# Patient Record
Sex: MALE | URBAN - METROPOLITAN AREA
[De-identification: names, ages, dates, MRNs, and addresses within clinical notes are randomized per-mention and may not be internally consistent; named-entity substitution may affect disease eponyms.]

---

## 2020-05-15 ENCOUNTER — APPOINTMENT (RX ONLY)
Dept: URBAN - METROPOLITAN AREA CLINIC 12 | Facility: CLINIC | Age: 25
Setting detail: DERMATOLOGY
End: 2020-05-15

## 2020-05-15 VITALS — TEMPERATURE: 97.9 F

## 2020-05-15 DIAGNOSIS — L43.8 OTHER LICHEN PLANUS: ICD-10-CM

## 2020-05-15 PROBLEM — D23.5 OTHER BENIGN NEOPLASM OF SKIN OF TRUNK: Status: ACTIVE | Noted: 2020-05-15

## 2020-05-15 PROBLEM — L30.9 DERMATITIS, UNSPECIFIED: Status: ACTIVE | Noted: 2020-05-15

## 2020-05-15 PROCEDURE — ? COUNSELING

## 2020-05-15 PROCEDURE — ? TREATMENT REGIMEN

## 2020-05-15 PROCEDURE — 99202 OFFICE O/P NEW SF 15 MIN: CPT

## 2020-05-15 PROCEDURE — ? PRESCRIPTION

## 2020-05-15 RX ORDER — TRIAMCINOLONE ACETONIDE 1 MG/G
CREAM TOPICAL BID
Qty: 1 | Refills: 3 | Status: ERX | COMMUNITY
Start: 2020-05-15

## 2020-05-15 RX ADMIN — TRIAMCINOLONE ACETONIDE: 1 CREAM TOPICAL at 00:00

## 2020-05-15 ASSESSMENT — LOCATION ZONE DERM: LOCATION ZONE: PENIS

## 2020-05-15 ASSESSMENT — PAIN INTENSITY VAS: HOW INTENSE IS YOUR PAIN 0 BEING NO PAIN, 10 BEING THE MOST SEVERE PAIN POSSIBLE?: NO PAIN

## 2020-05-15 ASSESSMENT — LOCATION DETAILED DESCRIPTION DERM: LOCATION DETAILED: DORSAL PENILE SHAFT

## 2020-05-15 ASSESSMENT — LOCATION SIMPLE DESCRIPTION DERM: LOCATION SIMPLE: PENIS

## 2020-05-15 ASSESSMENT — SEVERITY ASSESSMENT: SEVERITY: MILD

## 2020-05-15 NOTE — PROCEDURE: TREATMENT REGIMEN
Initiate Treatment: Triamcinolone 0.1% ointment BID for two weeks to affected area until follow up
Detail Level: Simple
Plan: Declined biopsy at this time
Otc Regimen: Dove sensitive skin soap \\nAquaphor

## 2020-05-15 NOTE — HPI: SKIN LESION
Is This A New Presentation, Or A Follow-Up?: Skin Lesions
How Severe Is Your Skin Lesion?: mild
Has Your Skin Lesion Been Treated?: not been treated
Additional History: Pt has been experiencing a cycle of redness that drys out and flakes off every couple of weeks, got STD screened and everything came back negative, used an anti fungal cream BID for 4 days but it did not help so he stopped

## 2020-09-18 ENCOUNTER — APPOINTMENT (RX ONLY)
Dept: URBAN - METROPOLITAN AREA CLINIC 12 | Facility: CLINIC | Age: 25
Setting detail: DERMATOLOGY
End: 2020-09-18

## 2020-09-18 DIAGNOSIS — L28.0 LICHEN SIMPLEX CHRONICUS: ICD-10-CM

## 2020-09-18 PROCEDURE — 99212 OFFICE O/P EST SF 10 MIN: CPT

## 2020-09-18 PROCEDURE — ? COUNSELING

## 2020-09-18 PROCEDURE — ? TREATMENT REGIMEN

## 2020-09-18 ASSESSMENT — LOCATION DETAILED DESCRIPTION DERM: LOCATION DETAILED: BASE OF DORSAL PENILE SHAFT

## 2020-09-18 ASSESSMENT — LOCATION SIMPLE DESCRIPTION DERM: LOCATION SIMPLE: DORSAL PENIS

## 2020-09-18 ASSESSMENT — LOCATION ZONE DERM: LOCATION ZONE: PENIS

## 2020-09-18 NOTE — PROCEDURE: TREATMENT REGIMEN
Discontinue Regimen: Triamcinolone 0.1% ointment
Detail Level: Simple
Modify Regimen: Apply the steroid cream bid for 2 weeks maximum.  Patient understands that they should apply bid for maximum benefit.  Patient should apply a few days after rash is clear, as long as he is within the 3 week treatment period.  Patient should apply Cerave cream on top of steroid cream, immediately after bath or shower, and throughout the day as needed for overall moisture.
Samples Given: Ultravate lotion bid for up to 2 weeks then discontinue.  Cerave cream.\\nif needed take at least 1 week off and my use TAC .1% cream bid up to 2 weeks.  Avoid sexual activity until healed

## 2020-11-30 ENCOUNTER — APPOINTMENT (RX ONLY)
Dept: URBAN - METROPOLITAN AREA CLINIC 12 | Facility: CLINIC | Age: 25
Setting detail: DERMATOLOGY
End: 2020-11-30

## 2020-11-30 DIAGNOSIS — L28.0 LICHEN SIMPLEX CHRONICUS: ICD-10-CM

## 2020-11-30 PROCEDURE — ? PRESCRIPTION SAMPLES PROVIDED

## 2020-11-30 PROCEDURE — 99213 OFFICE O/P EST LOW 20 MIN: CPT

## 2020-11-30 PROCEDURE — ? COUNSELING

## 2020-11-30 PROCEDURE — ? PRESCRIPTION

## 2020-11-30 RX ORDER — TRIAMCINOLONE ACETONIDE 1 MG/G
CREAM TOPICAL BID
Qty: 1 | Refills: 3 | Status: ERX

## 2020-11-30 ASSESSMENT — LOCATION ZONE DERM: LOCATION ZONE: PENIS

## 2020-11-30 ASSESSMENT — LOCATION DETAILED DESCRIPTION DERM: LOCATION DETAILED: BASE OF DORSAL PENILE SHAFT

## 2020-11-30 ASSESSMENT — LOCATION SIMPLE DESCRIPTION DERM: LOCATION SIMPLE: DORSAL PENIS

## 2020-11-30 NOTE — PROCEDURE: PRESCRIPTION SAMPLES PROVIDED
Detail Level: Simple
Samples Given: Halog apply bid to affected area for 10 days then switch to TAC 0.1% be bid for another week then dc.  May use TAC .1% crm bid Monday and Thursdays for maintainance.  Recommended Cerave cream as much as possible.  See back if not better in 3 weeks or at least annually